# Patient Record
Sex: FEMALE | Race: WHITE | Employment: UNEMPLOYED | URBAN - METROPOLITAN AREA
[De-identification: names, ages, dates, MRNs, and addresses within clinical notes are randomized per-mention and may not be internally consistent; named-entity substitution may affect disease eponyms.]

---

## 2021-04-04 ENCOUNTER — APPOINTMENT (OUTPATIENT)
Dept: GENERAL RADIOLOGY | Age: 66
End: 2021-04-04
Payer: MEDICARE

## 2021-04-04 ENCOUNTER — APPOINTMENT (OUTPATIENT)
Dept: CT IMAGING | Age: 66
End: 2021-04-04
Payer: MEDICARE

## 2021-04-04 ENCOUNTER — HOSPITAL ENCOUNTER (EMERGENCY)
Age: 66
Discharge: OTHER INSTITUTION | End: 2021-04-05
Attending: EMERGENCY MEDICINE
Payer: MEDICARE

## 2021-04-04 VITALS
DIASTOLIC BLOOD PRESSURE: 64 MMHG | SYSTOLIC BLOOD PRESSURE: 128 MMHG | OXYGEN SATURATION: 99 % | RESPIRATION RATE: 18 BRPM | BODY MASS INDEX: 25.71 KG/M2 | TEMPERATURE: 99.4 F | HEIGHT: 66 IN | HEART RATE: 114 BPM | WEIGHT: 160 LBS

## 2021-04-04 DIAGNOSIS — F23 ACUTE PSYCHOSIS (HCC): Primary | ICD-10-CM

## 2021-04-04 LAB
ACETAMINOPHEN LEVEL: <5 MCG/ML (ref 10–30)
ALBUMIN SERPL-MCNC: 4.2 G/DL (ref 3.5–5.2)
ALP BLD-CCNC: 98 U/L (ref 35–104)
ALT SERPL-CCNC: 14 U/L (ref 0–32)
AMPHETAMINE SCREEN, URINE: NOT DETECTED
ANION GAP SERPL CALCULATED.3IONS-SCNC: 14 MMOL/L (ref 7–16)
AST SERPL-CCNC: 18 U/L (ref 0–31)
BACTERIA: NORMAL /HPF
BARBITURATE SCREEN URINE: NOT DETECTED
BASOPHILS ABSOLUTE: 0.11 E9/L (ref 0–0.2)
BASOPHILS RELATIVE PERCENT: 1.7 % (ref 0–2)
BENZODIAZEPINE SCREEN, URINE: NOT DETECTED
BILIRUB SERPL-MCNC: 1 MG/DL (ref 0–1.2)
BILIRUBIN URINE: NEGATIVE
BLOOD, URINE: ABNORMAL
BUN BLDV-MCNC: 14 MG/DL (ref 8–23)
CALCIUM SERPL-MCNC: 9.9 MG/DL (ref 8.6–10.2)
CANNABINOID SCREEN URINE: NOT DETECTED
CHLORIDE BLD-SCNC: 105 MMOL/L (ref 98–107)
CLARITY: CLEAR
CO2: 24 MMOL/L (ref 22–29)
COCAINE METABOLITE SCREEN URINE: NOT DETECTED
COLOR: YELLOW
CREAT SERPL-MCNC: 0.8 MG/DL (ref 0.5–1)
EOSINOPHILS ABSOLUTE: 0.11 E9/L (ref 0.05–0.5)
EOSINOPHILS RELATIVE PERCENT: 1.7 % (ref 0–6)
ETHANOL: <10 MG/DL (ref 0–0.08)
FENTANYL SCREEN, URINE: NOT DETECTED
GFR AFRICAN AMERICAN: >60
GFR NON-AFRICAN AMERICAN: >60 ML/MIN/1.73
GLUCOSE BLD-MCNC: 128 MG/DL (ref 74–99)
GLUCOSE URINE: NEGATIVE MG/DL
HCT VFR BLD CALC: 37.5 % (ref 34–48)
HEMOGLOBIN: 12.3 G/DL (ref 11.5–15.5)
KETONES, URINE: NEGATIVE MG/DL
LEUKOCYTE ESTERASE, URINE: ABNORMAL
LYMPHOCYTES ABSOLUTE: 0.19 E9/L (ref 1.5–4)
LYMPHOCYTES RELATIVE PERCENT: 2.6 % (ref 20–42)
Lab: NORMAL
MCH RBC QN AUTO: 30.5 PG (ref 26–35)
MCHC RBC AUTO-ENTMCNC: 32.8 % (ref 32–34.5)
MCV RBC AUTO: 93.1 FL (ref 80–99.9)
METHADONE SCREEN, URINE: NOT DETECTED
MONOCYTES ABSOLUTE: 0.5 E9/L (ref 0.1–0.95)
MONOCYTES RELATIVE PERCENT: 7.8 % (ref 2–12)
NEUTROPHILS ABSOLUTE: 5.33 E9/L (ref 1.8–7.3)
NEUTROPHILS RELATIVE PERCENT: 86.1 % (ref 43–80)
NITRITE, URINE: NEGATIVE
OPIATE SCREEN URINE: NOT DETECTED
OXYCODONE URINE: NOT DETECTED
PDW BLD-RTO: 12.3 FL (ref 11.5–15)
PH UA: 5.5 (ref 5–9)
PHENCYCLIDINE SCREEN URINE: NOT DETECTED
PLATELET # BLD: 390 E9/L (ref 130–450)
PMV BLD AUTO: 8.4 FL (ref 7–12)
POTASSIUM SERPL-SCNC: 3.7 MMOL/L (ref 3.5–5)
PROTEIN UA: NEGATIVE MG/DL
RBC # BLD: 4.03 E12/L (ref 3.5–5.5)
RBC # BLD: NORMAL 10*6/UL
RBC UA: NORMAL /HPF (ref 0–2)
SALICYLATE, SERUM: <0.3 MG/DL (ref 0–30)
SARS-COV-2, NAAT: NOT DETECTED
SODIUM BLD-SCNC: 143 MMOL/L (ref 132–146)
SPECIFIC GRAVITY UA: 1.02 (ref 1–1.03)
TOTAL CK: 128 U/L (ref 20–180)
TOTAL PROTEIN: 7.5 G/DL (ref 6.4–8.3)
TRICYCLIC ANTIDEPRESSANTS SCREEN SERUM: NEGATIVE NG/ML
TSH SERPL DL<=0.05 MIU/L-ACNC: 1.99 UIU/ML (ref 0.27–4.2)
UROBILINOGEN, URINE: 0.2 E.U./DL
WBC # BLD: 6.2 E9/L (ref 4.5–11.5)
WBC UA: NORMAL /HPF (ref 0–5)

## 2021-04-04 PROCEDURE — 6370000000 HC RX 637 (ALT 250 FOR IP): Performed by: NURSE PRACTITIONER

## 2021-04-04 PROCEDURE — 80179 DRUG ASSAY SALICYLATE: CPT

## 2021-04-04 PROCEDURE — 80307 DRUG TEST PRSMV CHEM ANLYZR: CPT

## 2021-04-04 PROCEDURE — 84443 ASSAY THYROID STIM HORMONE: CPT

## 2021-04-04 PROCEDURE — 85025 COMPLETE CBC W/AUTO DIFF WBC: CPT

## 2021-04-04 PROCEDURE — 87635 SARS-COV-2 COVID-19 AMP PRB: CPT

## 2021-04-04 PROCEDURE — 87088 URINE BACTERIA CULTURE: CPT

## 2021-04-04 PROCEDURE — 2580000003 HC RX 258: Performed by: NURSE PRACTITIONER

## 2021-04-04 PROCEDURE — 82077 ASSAY SPEC XCP UR&BREATH IA: CPT

## 2021-04-04 PROCEDURE — 71046 X-RAY EXAM CHEST 2 VIEWS: CPT

## 2021-04-04 PROCEDURE — 81001 URINALYSIS AUTO W/SCOPE: CPT

## 2021-04-04 PROCEDURE — 80143 DRUG ASSAY ACETAMINOPHEN: CPT

## 2021-04-04 PROCEDURE — 93005 ELECTROCARDIOGRAM TRACING: CPT | Performed by: NURSE PRACTITIONER

## 2021-04-04 PROCEDURE — 72125 CT NECK SPINE W/O DYE: CPT

## 2021-04-04 PROCEDURE — 99285 EMERGENCY DEPT VISIT HI MDM: CPT

## 2021-04-04 PROCEDURE — 82550 ASSAY OF CK (CPK): CPT

## 2021-04-04 PROCEDURE — 80053 COMPREHEN METABOLIC PANEL: CPT

## 2021-04-04 PROCEDURE — 70450 CT HEAD/BRAIN W/O DYE: CPT

## 2021-04-04 RX ORDER — 0.9 % SODIUM CHLORIDE 0.9 %
500 INTRAVENOUS SOLUTION INTRAVENOUS ONCE
Status: COMPLETED | OUTPATIENT
Start: 2021-04-04 | End: 2021-04-04

## 2021-04-04 RX ORDER — LORAZEPAM 1 MG/1
1 TABLET ORAL ONCE
Status: COMPLETED | OUTPATIENT
Start: 2021-04-04 | End: 2021-04-04

## 2021-04-04 RX ADMIN — LORAZEPAM 1 MG: 1 TABLET ORAL at 14:40

## 2021-04-04 RX ADMIN — SODIUM CHLORIDE 500 ML: 9 INJECTION, SOLUTION INTRAVENOUS at 10:59

## 2021-04-04 NOTE — ED NOTES
Emergency Department [facility] Biopsychosocial Assessment Note    Chief Complaint:     Patient is a 63year old, female presenting to ED after friends called emergency services due to patient not responding to their calls for 2 days. EMS arrived w/ Fire Dept., to find patient in bed sleeping with home thermostat on 52 degrees. When EMS questioned patient, she instructed them to ask her  \"sleeping besides her\". Per family friend, Yoanna Banda, patient has never been . Patient also reportedly thought she had already been to [employer] early in the morning, when in fact she was in bed and her friend Red Ng normally transports her. Patient admits that she has not taken her medications in several days because she did not think it was important these last few days. In the [facility], after continuous observation patient displays bizarre behaviors (odd hand gestures - shaking hand, poking lips), smiling/laughing inappropriately, & talking to unseen others. Patient attempts to hide these behaviors from staff. Patient denies any suicidal or homicidal ideations. MSE: Patient is alert & oriented x 4. Patient mood is in control, affect congruent. Patient thought process/speech are clear. Patient denies A/V hallucinations - but is obviously internally stimulated. Clinical Summary/History:     Patient has a mental health hx of bipolar disorder, currently being managed by her PCP - noncompliant with her medications for 3+ days; and patient last psychiatric hospitalization is unknown. Patient reports ok sleep, ok appetite, no reports of hopelessness/helplessness. Patient reports no hx of suicide attempts or self injurious behaviors. Patient denies any drug/alcohol use. Gender  [] Male [x] Female [] Transgender  [] Other    Sexual Orientation    [x] Heterosexual [] Homosexual [] Bisexual [] Other    Suicidal Behavioral: CSSR-S Complete.   [] Reports:    [] Past [] Present   [x] Denies    Homicidal/ Violent Behavior  [] Reports:   [] Past [] Present   [x] Denies     Hallucinations/Delusions   [] Reports:   [x] Denies  However patient internally stimulated    Substance Use/Alcohol Use/Addiction: SBIRT Screen Complete. [] Reports:   [x] Denies     Trauma History  [] Reports:  [x] Denies     Collateral Information:       Level of Care/Disposition Plan  [] Home:   [] Outpatient Provider:   [] Crisis Unit:   [x] Inpatient Psychiatric Unit:  [] Other:     Patient pink slipped for safety. SW will pursue inpatient admission for safety/stabilization.      Chilo Mandel, MSW, LSW  04/04/21 4569

## 2021-04-04 NOTE — ED NOTES
Patient is in her room talking to unseen others quietly.  Patient remains polite, calm, & cooperative     SIOBHAN Wilson, [place]  04/04/21 5631

## 2021-04-04 NOTE — ED NOTES
Updated patient friend, Prema Marsha, on plan to refer patient for inpatient geriatric psych. Prema Larson, reports she will go to patient house and gather some belongings and patient's dentures and bring them to the hospital.]    Prema Larson, reports she will relay the information to patient mutual friend Whit An who is also listed in patient chart.      Margo Palma, SIOBHAN, ANGELICAW  04/04/21 4144

## 2021-04-04 NOTE — ED NOTES
Pt pulled out her iv then states its not bleeding when this rn placed dsd on site.      Lilly Obregon, LESIA  04/04/21 6234

## 2021-04-04 NOTE — ED PROVIDER NOTES
ED Attending  CC: Shivani Josue 476  Department of Emergency Medicine   ED  Encounter Note  Admit Date/RoomTime: 2021  9:52 AM  ED Room: [address] [place] [address]    NAME: Joslyn Ortiz  : 1955  MRN: [id number]     Chief Complaint:  Psychiatric Evaluation (stopped taking meds, denies SI or HI)    History of Present Illness       Joslyn Ortiz is a 63 y.o. old female who presents to the emergency department by ambulance, for psychiatric evaluation/medical clearance. A friend convince the patient to come for evaluation after not answering the phone at 1700 yesterday and not answering the door this morning. The police department was called and the home was breeched. Patient was found in bed and the home was cold with the thermostat set at 52 degrees. Patient has a history of bipolar which is managed by her PCP. She denies taking her oral medications for the last several days as she felt they were not important. She denies any suicidal or homicidal ideation. She denies seeing or hearing things. She denies any complaints including no syncope, head injury, chest pain, shortness of breath, cough, abdominal pain, nausea, vomiting, diarrhea, dysuria, hematuria, peripheral edema, numbness, weakness or ataxia. Her friends convinced her to come for evaluation given the home environment and that she lives alone. Quality:      Hopelessness:   No.     Terror:   No.     Confusion:   No.     Hallucinations:   None. Able to care for self: No.  Able to control self: Yes. ROS   Pertinent positives and negatives are stated within HPI, all other systems reviewed and are negative. Past Medical History:  has no past medical history on file. Surgical History:  has no past surgical history on file. Social History:      Family History: family history is not on file. Allergies: Patient has no known allergies.     Physical Exam   Oxygen Saturation Interpretation: Normal.        ED Triage Vitals [04/04/21 0951]   BP Temp Temp src Pulse Resp SpO2 Height Weight   (!) 167/87 97.7 °F (36.5 °C) -- 112 18 (!) 98 % -- --         General Appearance:  well-appearing, street clothes, lying in bed, fair grooming and fair hygiene. Constitutional:   Level of Consciousness: Awake and alert. ETOH: No.          Distress: none. Cooperativeness: cooperative. Eyes:  Cornea cloudy, no discharge or conjunctival injection. Ears:  External ears without lesions. Throat:  Pharynx without injection, exudate, or tonsillar hypertrophy. Airway patient. Neck:  Normal ROM. Supple. No midline vertebral tenderness, step-off or crepitus. Respiratory:  Clear to auscultation and breath sounds equal.  No respiratory distress. CV:  Regular rate and rhythm, normal heart sounds, without pathological murmurs, ectopy, gallops, or rubs. Strong radial pulses. Strong pedal pulses bilaterally. GI:  Abdomen Soft, nontender, good bowel sounds. No firm or pulsatile mass. Back:  No costovertebral tenderness. No midline vertebral tenderness, step-off or crepitus. No outward sign of trauma the posterior torso. Integument:  Normal turgor. Warm, dry, without visible rash, unless noted elsewhere. Lymphatics: No lymphangitis or adenopathy noted. Neurological:  Oriented. Motor functions intact. Hand grasp, dorsiflexion and plantar flexion strong and equal bilaterally. Psychiatric:        Thought Process:       Coherent:  Yes. Delusions / Paranoia: no evidence of delusions and no evidence of paranoia. Flight of ideas:  No.         Rambling conversation:  No.       Affect: calm. Suicidal ideation:  no suicidal ideation. Homicidal ideation:  no homicidal ideation. Perceptions:  denies any perceptual disturbance present. Insight: above average. Judgement: above average.     Lab / Imaging Results   (All laboratory and radiology results have been personally reviewed by myself)  Labs:  Results for orders placed or performed during the hospital encounter of 04/04/21   COVID-19, Rapid    Specimen: Nasopharyngeal Swab   Result Value Ref Range    SARS-CoV-2, NAAT Not Detected Not Detected   Comprehensive Metabolic Panel   Result Value Ref Range    Sodium 143 132 - 146 mmol/L    Potassium 3.7 3.5 - 5.0 mmol/L    Chloride 105 98 - 107 mmol/L    CO2 24 22 - 29 mmol/L    Anion Gap 14 7 - 16 mmol/L    Glucose 128 (H) 74 - 99 mg/dL    BUN 14 8 - 23 mg/dL    CREATININE 0.8 0.5 - 1.0 mg/dL    GFR Non-African American >60 >=60 mL/min/1.73    GFR African American >60     Calcium 9.9 8.6 - 10.2 mg/dL    Total Protein 7.5 6.4 - 8.3 g/dL    Albumin 4.2 3.5 - 5.2 g/dL    Total Bilirubin 1.0 0.0 - 1.2 mg/dL    Alkaline Phosphatase 98 35 - 104 U/L    ALT 14 0 - 32 U/L    AST 18 0 - 31 U/L   CBC Auto Differential   Result Value Ref Range    WBC 6.2 4.5 - 11.5 E9/L    RBC 4.03 3.50 - 5.50 E12/L    Hemoglobin 12.3 11.5 - 15.5 g/dL    Hematocrit 37.5 34.0 - 48.0 %    MCV 93.1 80.0 - 99.9 fL    MCH 30.5 26.0 - 35.0 pg    MCHC 32.8 32.0 - 34.5 %    RDW 12.3 11.5 - 15.0 fL    Platelets 272 756 - 682 E9/L    MPV 8.4 7.0 - 12.0 fL    Neutrophils % 86.1 (H) 43.0 - 80.0 %    Lymphocytes % 2.6 (L) 20.0 - 42.0 %    Monocytes % 7.8 2.0 - 12.0 %    Eosinophils % 1.7 0.0 - 6.0 %    Basophils % 1.7 0.0 - 2.0 %    Neutrophils Absolute 5.33 1.80 - 7.30 E9/L    Lymphocytes Absolute 0.19 (L) 1.50 - 4.00 E9/L    Monocytes Absolute 0.50 0.10 - 0.95 E9/L    Eosinophils Absolute 0.11 0.05 - 0.50 E9/L    Basophils Absolute 0.11 0.00 - 0.20 E9/L    RBC Morphology Normal    Serum Drug Screen   Result Value Ref Range    Ethanol Lvl <10 mg/dL    Acetaminophen Level <5.0 (L) 10.0 - 29.7 mcg/mL    Salicylate, Serum <7.1 0.0 - 30.0 mg/dL    TCA Scrn NEGATIVE Cutoff:300 ng/mL   Urine Drug Screen   Result Value Ref Range    Amphetamine Screen, Urine NOT DETECTED Negative <1000 ng/mL    Barbiturate Screen, Ur NOT DETECTED Negative < 200 ng/mL    Benzodiazepine Screen, Urine NOT DETECTED Negative < 200 ng/mL    Cannabinoid Scrn, Ur NOT DETECTED Negative < 50ng/mL    Cocaine Metabolite Screen, Urine NOT DETECTED Negative < 300 ng/mL    Opiate Scrn, Ur NOT DETECTED Negative < 300ng/mL    PCP Screen, Urine NOT DETECTED Negative < 25 ng/mL    Methadone Screen, Urine NOT DETECTED Negative <300 ng/mL    Oxycodone Urine NOT DETECTED Negative <100 ng/mL    FENTANYL SCREEN, URINE NOT DETECTED Negative <1 ng/mL    Drug Screen Comment: see below    Urinalysis   Result Value Ref Range    Color, UA Yellow Straw/Yellow    Clarity, UA Clear Clear    Glucose, Ur Negative Negative mg/dL    Bilirubin Urine Negative Negative    Ketones, Urine Negative Negative mg/dL    Specific Gravity, UA 1.025 1.005 - 1.030    Blood, Urine TRACE-INTACT Negative    pH, UA 5.5 5.0 - 9.0    Protein, UA Negative Negative mg/dL    Urobilinogen, Urine 0.2 <2.0 E.U./dL    Nitrite, Urine Negative Negative    Leukocyte Esterase, Urine TRACE (A) Negative   TSH without Reflex   Result Value Ref Range    TSH 1.990 0.270 - 4.200 uIU/mL   CK   Result Value Ref Range    Total  20 - 180 U/L   Microscopic Urinalysis   Result Value Ref Range    WBC, UA 1-3 0 - 5 /HPF    RBC, UA 1-3 0 - 2 /HPF    Bacteria, UA NONE SEEN None Seen /HPF   EKG 12 Lead   Result Value Ref Range    Ventricular Rate 91 BPM    Atrial Rate 91 BPM    P-R Interval 126 ms    QRS Duration 82 ms    Q-T Interval 376 ms    QTc Calculation (Bazett) 462 ms    P Axis 62 degrees    R Axis 46 degrees    T Axis 46 degrees     Imaging: All Radiology results interpreted by Radiologist unless otherwise noted. CT HEAD WO CONTRAST   Final Result   No acute intracranial abnormality. CT CERVICAL SPINE WO CONTRAST   Final Result   No acute abnormality of the cervical spine. Abnormal findings in the thyroid gland. Further evaluation with thyroid   ultrasound which can be done on routine basis is recommended.          XR CHEST (2 VW)   Final Result   No acute cardiopulmonary process. EKG #1:  Interpreted by emergency department physician unless otherwise noted. Time:  1213    Rate: 91  Rhythm: Sinus rhythm. Interpretation: Normal EKG, normal sinus rhythm. ED Course / Medical Decision Making     Medications   LORazepam (ATIVAN) tablet 1 mg (has no administration in time range)   0.9 % sodium chloride bolus (0 mLs Intravenous Stopped 4/4/21 1130)        Re-examination:  4/4/21       Time: 1200   Patient updated on partial results. Given water as requested. EKG requested from nursing staff. Consult(s):   IP CONSULT TO SOCIAL WORK   Time: 1189  Patient having odd behavior and talking to people that are not there per the . Pink slip signed by Dr. Trinity Varela. Patient is medically cleared and plan is for admission to [facility]. Procedure(s):   none    MDM:   Patient evaluated by Dr. Trinity Varela, myself and . Labs and diagnostics as resulted above. EKG as above. Covid negative. CTs as interpreted by radiologist negative for acute pathology. She has no focal neurologic deficit. No clinical evidence warranting medical admission at this time. She was found to have bizarre behavior and hallucinations in the emergency department which she is appropriate for psychiatric admission at this time. Patient pink slipped by Dr. Trinity Varela. Patient medically cleared for mental health admission as arranged by the . Plan of Care/Counseling:  Myself, Emergency Attending Physician and  reviewed today's visit with the patient and friend in addition to providing specific details for the plan of care and counseling regarding the diagnosis and prognosis. Assessment      1. Acute psychosis [facility])      Plan   Inpatient Admission to Mental Health / Behavioral Unit - Patient is medically cleared for mental/behavioral health unit admission.   Patient condition is stable    New Medications New Prescriptions    No medications on file     Electronically signed by FER Zuniga CNP   DD: 4/4/21  **This report was transcribed using voice recognition software. Every effort was made to ensure accuracy; however, inadvertent computerized transcription errors may be present.   END OF ED PROVIDER NOTE       FER Zuniga CNP  04/04/21 7582

## 2021-04-04 NOTE — ED NOTES
Pt actively in room putting her feces on hr food tray and placing her hands near her mouth this rmn performed pt care and assisted pt to bathroom while pt is in bathroom she cont to place her hand under her and causing her hands to be full of feces as she is on the toilet she then sniffs her hands and tries to place them near her mouth this rn again performed pt care as pt is sitting on toilet on the toilet      Lilliana Cruz RN  04/04/21 9240

## 2021-04-05 LAB
EKG ATRIAL RATE: 91 BPM
EKG P AXIS: 62 DEGREES
EKG P-R INTERVAL: 126 MS
EKG Q-T INTERVAL: 376 MS
EKG QRS DURATION: 82 MS
EKG QTC CALCULATION (BAZETT): 462 MS
EKG R AXIS: 46 DEGREES
EKG T AXIS: 46 DEGREES
EKG VENTRICULAR RATE: 91 BPM
URINE CULTURE, ROUTINE: NORMAL

## 2021-04-05 NOTE — ED NOTES
Received call from Josue Goodson at [facility], [facility] will transport, ETA 3-4 hours. JANNIE Israel and Telly Cummings aware.       Mark Flaherty  04/04/21 6823

## 2021-04-05 NOTE — ED PROVIDER NOTES
10:37 PM EDT    I received this patient at sign out from Dr. Elsie Watts   I have discussed the patient's initial exam, treatment, and plan of care with the out going physician.      Cara slipped for psych transfer           Esvin Moore DO  04/04/21 8300

## 2021-04-05 NOTE — ED NOTES
Received call from [facility] at the [facility] with accepting information:     Patient was accepted by Dr. Robbie Underwood to [facility], room 404. N2N is [phone]. JANNIE Trinidad and Telly Magana  04/04/21 9788

## 2021-04-05 NOTE — ED NOTES
Received call from [facility], updated with earlier ETA of 0020. RN Delia Dyer and JANNIE morley.       Aster Bledsoe  04/04/21 2814

## 2021-04-05 NOTE — ED NOTES
Per Bernarda Snellen with the [employer], patient is accepted by [facility] pending receipt of EKG and pink slip via fax at [phone]. Information has been faxed. JANNIE morley.       Raeann Miranda  04/04/21 7657